# Patient Record
Sex: FEMALE | Race: WHITE | NOT HISPANIC OR LATINO | Employment: FULL TIME
[De-identification: names, ages, dates, MRNs, and addresses within clinical notes are randomized per-mention and may not be internally consistent; named-entity substitution may affect disease eponyms.]

---

## 2017-07-13 PROBLEM — I10 HTN, GOAL BELOW 130/80: Status: ACTIVE | Noted: 2017-07-13

## 2017-07-22 PROBLEM — D50.8 IRON DEFICIENCY ANEMIA SECONDARY TO INADEQUATE DIETARY IRON INTAKE: Status: ACTIVE | Noted: 2017-07-22

## 2017-09-07 ENCOUNTER — CHARTING TRANS (OUTPATIENT)
Dept: OTHER | Age: 42
End: 2017-09-07

## 2017-09-19 ENCOUNTER — HOSPITAL (OUTPATIENT)
Dept: OTHER | Age: 42
End: 2017-09-19
Attending: OBSTETRICS & GYNECOLOGY

## 2017-09-19 ENCOUNTER — IMAGING SERVICES (OUTPATIENT)
Dept: OTHER | Age: 42
End: 2017-09-19

## 2017-09-19 ENCOUNTER — CHARTING TRANS (OUTPATIENT)
Dept: OTHER | Age: 42
End: 2017-09-19

## 2018-09-27 ENCOUNTER — CHARTING TRANS (OUTPATIENT)
Dept: OTHER | Age: 43
End: 2018-09-27

## 2018-11-03 VITALS
HEIGHT: 62 IN | WEIGHT: 135 LBS | DIASTOLIC BLOOD PRESSURE: 78 MMHG | HEART RATE: 76 BPM | BODY MASS INDEX: 24.84 KG/M2 | SYSTOLIC BLOOD PRESSURE: 106 MMHG

## 2018-11-27 VITALS
WEIGHT: 145.99 LBS | DIASTOLIC BLOOD PRESSURE: 78 MMHG | HEIGHT: 62 IN | BODY MASS INDEX: 26.87 KG/M2 | SYSTOLIC BLOOD PRESSURE: 116 MMHG

## 2019-01-23 ENCOUNTER — HOSPITAL (OUTPATIENT)
Dept: OTHER | Age: 44
End: 2019-01-23
Attending: OBSTETRICS & GYNECOLOGY

## 2019-06-17 PROBLEM — G54.0 THORACIC OUTLET SYNDROME: Status: ACTIVE | Noted: 2019-06-17

## 2019-06-17 PROBLEM — G25.89 SCAPULAR DYSKINESIS: Status: ACTIVE | Noted: 2019-06-17

## 2019-06-17 PROBLEM — S46.819A TRAPEZIUS MUSCLE STRAIN: Status: ACTIVE | Noted: 2019-06-17

## 2019-06-17 PROBLEM — M75.42 IMPINGEMENT SYNDROME OF SHOULDER, LEFT: Status: ACTIVE | Noted: 2019-06-17

## 2019-07-22 PROBLEM — M54.2 CERVICAL PAIN (NECK): Status: ACTIVE | Noted: 2019-07-22

## 2019-09-05 PROBLEM — M79.18 MYOFASCIAL PAIN: Status: ACTIVE | Noted: 2019-09-05

## 2019-10-17 ENCOUNTER — OFFICE VISIT (OUTPATIENT)
Dept: OBGYN | Age: 44
End: 2019-10-17

## 2019-10-17 VITALS
DIASTOLIC BLOOD PRESSURE: 74 MMHG | BODY MASS INDEX: 25.81 KG/M2 | HEIGHT: 62 IN | WEIGHT: 140.25 LBS | SYSTOLIC BLOOD PRESSURE: 142 MMHG

## 2019-10-17 DIAGNOSIS — Z12.31 ENCOUNTER FOR SCREENING MAMMOGRAM FOR MALIGNANT NEOPLASM OF BREAST: ICD-10-CM

## 2019-10-17 DIAGNOSIS — Z12.4 PAPANICOLAOU SMEAR FOR CERVICAL CANCER SCREENING: Primary | ICD-10-CM

## 2019-10-17 DIAGNOSIS — Z11.51 SCREENING FOR HUMAN PAPILLOMAVIRUS (HPV): ICD-10-CM

## 2019-10-17 PROCEDURE — 36415 COLL VENOUS BLD VENIPUNCTURE: CPT | Performed by: OBSTETRICS & GYNECOLOGY

## 2019-10-17 PROCEDURE — 99396 PREV VISIT EST AGE 40-64: CPT | Performed by: OBSTETRICS & GYNECOLOGY

## 2019-10-17 RX ORDER — LISINOPRIL 5 MG/1
5 TABLET ORAL
COMMUNITY
Start: 2019-07-02

## 2019-10-17 ASSESSMENT — ENCOUNTER SYMPTOMS
HEADACHES: 1
UNEXPECTED WEIGHT CHANGE: 0
APPETITE CHANGE: 0
ACTIVITY CHANGE: 0
FATIGUE: 0
NERVOUS/ANXIOUS: 0

## 2019-10-28 LAB — HPV16+18+45 E6+E7MRNA CVX NAA+PROBE: NORMAL

## 2019-11-25 PROBLEM — M54.12 CERVICAL RADICULITIS: Status: ACTIVE | Noted: 2019-11-25

## 2020-02-25 ENCOUNTER — HOSPITAL (OUTPATIENT)
Dept: OTHER | Age: 45
End: 2020-02-25

## 2020-02-25 PROCEDURE — 99283 EMERGENCY DEPT VISIT LOW MDM: CPT | Performed by: EMERGENCY MEDICINE

## 2020-10-01 ENCOUNTER — EMPLOYEE HEALTH (OUTPATIENT)
Dept: OTHER | Age: 45
End: 2020-10-01

## 2020-10-01 DIAGNOSIS — Z20.822 SUSPECTED COVID-19 VIRUS INFECTION: Primary | ICD-10-CM

## 2020-10-02 ENCOUNTER — LAB SERVICES (OUTPATIENT)
Dept: LAB | Age: 45
End: 2020-10-02

## 2020-10-02 DIAGNOSIS — Z20.822 SUSPECTED COVID-19 VIRUS INFECTION: ICD-10-CM

## 2020-10-02 PROCEDURE — U0003 INFECTIOUS AGENT DETECTION BY NUCLEIC ACID (DNA OR RNA); SEVERE ACUTE RESPIRATORY SYNDROME CORONAVIRUS 2 (SARS-COV-2) (CORONAVIRUS DISEASE [COVID-19]), AMPLIFIED PROBE TECHNIQUE, MAKING USE OF HIGH THROUGHPUT TECHNOLOGIES AS DESCRIBED BY CMS-2020-01-R: HCPCS | Performed by: HOSPITALIST

## 2020-10-03 ENCOUNTER — EMPLOYEE HEALTH (OUTPATIENT)
Dept: OTHER | Age: 45
End: 2020-10-03

## 2020-10-03 LAB
SARS-COV-2 RNA RESP QL NAA+PROBE: NOT DETECTED
SERVICE CMNT-IMP: NORMAL
SPECIMEN SOURCE: NORMAL

## 2020-10-04 ENCOUNTER — EMPLOYEE HEALTH (OUTPATIENT)
Dept: OTHER | Age: 45
End: 2020-10-04

## 2020-10-06 ENCOUNTER — EMPLOYEE HEALTH (OUTPATIENT)
Dept: OTHER | Age: 45
End: 2020-10-06

## 2020-12-02 ENCOUNTER — OFFICE VISIT (OUTPATIENT)
Dept: OBGYN | Age: 45
End: 2020-12-02

## 2020-12-02 VITALS
WEIGHT: 143.6 LBS | SYSTOLIC BLOOD PRESSURE: 125 MMHG | BODY MASS INDEX: 26.43 KG/M2 | TEMPERATURE: 97.4 F | HEIGHT: 62 IN | HEART RATE: 64 BPM | DIASTOLIC BLOOD PRESSURE: 80 MMHG

## 2020-12-02 DIAGNOSIS — N63.0 BREAST LUMP: Primary | ICD-10-CM

## 2020-12-02 PROBLEM — Z78.9 RELIES ON VASECTOMY AS PRIMARY BIRTH CONTROL METHOD: Status: ACTIVE | Noted: 2020-12-02

## 2020-12-02 PROCEDURE — 99396 PREV VISIT EST AGE 40-64: CPT | Performed by: OBSTETRICS & GYNECOLOGY

## 2020-12-02 ASSESSMENT — PATIENT HEALTH QUESTIONNAIRE - PHQ9
SUM OF ALL RESPONSES TO PHQ9 QUESTIONS 1 AND 2: 0
SUM OF ALL RESPONSES TO PHQ9 QUESTIONS 1 AND 2: 0
1. LITTLE INTEREST OR PLEASURE IN DOING THINGS: NOT AT ALL
CLINICAL INTERPRETATION OF PHQ9 SCORE: NO FURTHER SCREENING NEEDED
CLINICAL INTERPRETATION OF PHQ2 SCORE: NO FURTHER SCREENING NEEDED
2. FEELING DOWN, DEPRESSED OR HOPELESS: NOT AT ALL

## 2020-12-02 ASSESSMENT — ENCOUNTER SYMPTOMS
GASTROINTESTINAL NEGATIVE: 1
NERVOUS/ANXIOUS: 0
RESPIRATORY NEGATIVE: 1
CONSTITUTIONAL NEGATIVE: 1
HEADACHES: 0

## 2020-12-09 PROBLEM — M23.91 INTERNAL DERANGEMENT OF RIGHT KNEE: Status: ACTIVE | Noted: 2020-12-09

## 2020-12-09 PROBLEM — M22.41 CHONDROMALACIA OF PATELLOFEMORAL JOINT, RIGHT: Status: ACTIVE | Noted: 2020-12-09

## 2020-12-11 ENCOUNTER — HOSPITAL ENCOUNTER (OUTPATIENT)
Dept: ULTRASOUND IMAGING | Age: 45
Discharge: HOME | End: 2020-12-11
Attending: OBSTETRICS & GYNECOLOGY

## 2020-12-11 ENCOUNTER — HOSPITAL ENCOUNTER (OUTPATIENT)
Dept: MAMMOGRAPHY | Age: 45
Discharge: HOME | End: 2020-12-11
Attending: OBSTETRICS & GYNECOLOGY

## 2020-12-11 DIAGNOSIS — N63.0 BREAST LUMP: ICD-10-CM

## 2020-12-11 PROCEDURE — 77066 DX MAMMO INCL CAD BI: CPT

## 2020-12-11 PROCEDURE — 76642 ULTRASOUND BREAST LIMITED: CPT

## 2020-12-17 ENCOUNTER — OFFICE VISIT (OUTPATIENT)
Dept: SURGERY | Age: 45
End: 2020-12-17
Attending: SURGERY

## 2020-12-17 ENCOUNTER — IMMUNIZATION (OUTPATIENT)
Dept: LAB | Age: 45
End: 2020-12-17

## 2020-12-17 VITALS
RESPIRATION RATE: 16 BRPM | SYSTOLIC BLOOD PRESSURE: 132 MMHG | WEIGHT: 140 LBS | HEIGHT: 62 IN | DIASTOLIC BLOOD PRESSURE: 85 MMHG | HEART RATE: 62 BPM | BODY MASS INDEX: 25.76 KG/M2

## 2020-12-17 DIAGNOSIS — N63.20 LEFT BREAST LUMP: Primary | ICD-10-CM

## 2020-12-17 DIAGNOSIS — Z23 NEED FOR VACCINATION: Primary | ICD-10-CM

## 2020-12-17 PROCEDURE — 3075F SYST BP GE 130 - 139MM HG: CPT | Performed by: SURGERY

## 2020-12-17 PROCEDURE — 0001A COVID 19 PFIZER-BIONTECH: CPT

## 2020-12-17 PROCEDURE — 99243 OFF/OP CNSLTJ NEW/EST LOW 30: CPT | Performed by: SURGERY

## 2020-12-17 PROCEDURE — 91300 COVID 19 PFIZER-BIONTECH: CPT

## 2020-12-17 PROCEDURE — 3079F DIAST BP 80-89 MM HG: CPT | Performed by: SURGERY

## 2020-12-17 RX ORDER — MELOXICAM 15 MG/1
TABLET ORAL
COMMUNITY
Start: 2020-12-09 | End: 2022-02-01 | Stop reason: ALTCHOICE

## 2021-01-06 ENCOUNTER — IMMUNIZATION (OUTPATIENT)
Dept: LAB | Age: 46
End: 2021-01-06

## 2021-01-06 DIAGNOSIS — Z23 NEED FOR VACCINATION: Primary | ICD-10-CM

## 2021-01-06 PROCEDURE — 0002A COVID 19 PFIZER-BIONTECH: CPT

## 2021-01-06 PROCEDURE — 91300 COVID 19 PFIZER-BIONTECH: CPT

## 2021-09-08 PROBLEM — S46.819A TRAPEZIUS MUSCLE STRAIN: Status: RESOLVED | Noted: 2019-06-17 | Resolved: 2021-09-08

## 2021-09-08 PROBLEM — M79.18 MYOFASCIAL PAIN: Status: RESOLVED | Noted: 2019-09-05 | Resolved: 2021-09-08

## 2021-09-08 PROBLEM — M75.42 IMPINGEMENT SYNDROME OF SHOULDER, LEFT: Status: RESOLVED | Noted: 2019-06-17 | Resolved: 2021-09-08

## 2021-09-08 PROBLEM — M54.2 CERVICAL PAIN (NECK): Status: RESOLVED | Noted: 2019-07-22 | Resolved: 2021-09-08

## 2021-09-08 PROBLEM — M23.91 INTERNAL DERANGEMENT OF RIGHT KNEE: Status: RESOLVED | Noted: 2020-12-09 | Resolved: 2021-09-08

## 2021-09-08 PROBLEM — G25.89 SCAPULAR DYSKINESIS: Status: RESOLVED | Noted: 2019-06-17 | Resolved: 2021-09-08

## 2021-10-08 ENCOUNTER — TELEPHONE (OUTPATIENT)
Dept: SCHEDULING | Age: 46
End: 2021-10-08

## 2021-10-20 ENCOUNTER — IMMUNIZATION (OUTPATIENT)
Dept: LAB | Age: 46
End: 2021-10-20

## 2021-10-20 DIAGNOSIS — Z23 NEED FOR VACCINATION: Primary | ICD-10-CM

## 2021-10-20 PROCEDURE — 91300 COVID 19 PFIZER-BIONTECH: CPT

## 2021-10-20 PROCEDURE — 0003A COVID 19 PFIZER-BIONTECH: CPT

## 2022-01-03 ENCOUNTER — EMPLOYEE HEALTH (OUTPATIENT)
Dept: OTHER | Age: 47
End: 2022-01-03

## 2022-01-05 ENCOUNTER — EMPLOYEE HEALTH (OUTPATIENT)
Dept: OTHER | Age: 47
End: 2022-01-05

## 2022-02-01 ENCOUNTER — OFFICE VISIT (OUTPATIENT)
Dept: OBGYN | Age: 47
End: 2022-02-01

## 2022-02-01 VITALS
HEART RATE: 62 BPM | DIASTOLIC BLOOD PRESSURE: 79 MMHG | WEIGHT: 145 LBS | SYSTOLIC BLOOD PRESSURE: 126 MMHG | BODY MASS INDEX: 26.52 KG/M2

## 2022-02-01 DIAGNOSIS — Z12.31 ENCOUNTER FOR SCREENING MAMMOGRAM FOR MALIGNANT NEOPLASM OF BREAST: Primary | ICD-10-CM

## 2022-02-01 PROCEDURE — 99396 PREV VISIT EST AGE 40-64: CPT | Performed by: OBSTETRICS & GYNECOLOGY

## 2022-02-01 PROCEDURE — 3074F SYST BP LT 130 MM HG: CPT | Performed by: OBSTETRICS & GYNECOLOGY

## 2022-02-01 PROCEDURE — 3078F DIAST BP <80 MM HG: CPT | Performed by: OBSTETRICS & GYNECOLOGY

## 2022-02-01 ASSESSMENT — ENCOUNTER SYMPTOMS
GASTROINTESTINAL NEGATIVE: 1
RESPIRATORY NEGATIVE: 1
HEADACHES: 0
CONSTITUTIONAL NEGATIVE: 1
NERVOUS/ANXIOUS: 0

## 2022-02-16 ENCOUNTER — HOSPITAL ENCOUNTER (OUTPATIENT)
Dept: MAMMOGRAPHY | Age: 47
Discharge: HOME | End: 2022-02-16
Attending: OBSTETRICS & GYNECOLOGY

## 2022-02-16 DIAGNOSIS — Z12.31 ENCOUNTER FOR SCREENING MAMMOGRAM FOR MALIGNANT NEOPLASM OF BREAST: ICD-10-CM

## 2022-02-16 PROCEDURE — 77067 SCR MAMMO BI INCL CAD: CPT

## 2022-09-28 ENCOUNTER — IMMUNIZATION (OUTPATIENT)
Dept: INTERNAL MEDICINE | Age: 47
End: 2022-09-28

## 2022-09-28 DIAGNOSIS — Z23 NEED FOR INFLUENZA VACCINATION: Primary | ICD-10-CM

## 2022-09-28 PROCEDURE — 90686 IIV4 VACC NO PRSV 0.5 ML IM: CPT | Performed by: INTERNAL MEDICINE

## 2022-09-28 PROCEDURE — 90471 IMMUNIZATION ADMIN: CPT | Performed by: INTERNAL MEDICINE

## 2022-11-03 ENCOUNTER — IMAGING SERVICES (OUTPATIENT)
Dept: OTHER | Age: 47
End: 2022-11-03
Attending: OBSTETRICS & GYNECOLOGY

## 2023-03-30 ENCOUNTER — APPOINTMENT (OUTPATIENT)
Dept: OBGYN | Age: 48
End: 2023-03-30

## 2023-04-06 ENCOUNTER — OFFICE VISIT (OUTPATIENT)
Dept: OBGYN | Age: 48
End: 2023-04-06

## 2023-04-06 VITALS
RESPIRATION RATE: 17 BRPM | WEIGHT: 151.01 LBS | BODY MASS INDEX: 27.62 KG/M2 | SYSTOLIC BLOOD PRESSURE: 122 MMHG | HEART RATE: 63 BPM | OXYGEN SATURATION: 99 % | DIASTOLIC BLOOD PRESSURE: 85 MMHG

## 2023-04-06 DIAGNOSIS — N92.0 MENORRHAGIA WITH REGULAR CYCLE: Primary | ICD-10-CM

## 2023-04-06 DIAGNOSIS — Z12.4 SCREENING FOR CERVICAL CANCER: ICD-10-CM

## 2023-04-06 PROCEDURE — 88175 CYTOPATH C/V AUTO FLUID REDO: CPT | Performed by: INTERNAL MEDICINE

## 2023-04-06 PROCEDURE — 87624 HPV HI-RISK TYP POOLED RSLT: CPT | Performed by: INTERNAL MEDICINE

## 2023-04-06 PROCEDURE — 36415 COLL VENOUS BLD VENIPUNCTURE: CPT | Performed by: OBSTETRICS & GYNECOLOGY

## 2023-04-06 PROCEDURE — 3074F SYST BP LT 130 MM HG: CPT | Performed by: OBSTETRICS & GYNECOLOGY

## 2023-04-06 PROCEDURE — 3079F DIAST BP 80-89 MM HG: CPT | Performed by: OBSTETRICS & GYNECOLOGY

## 2023-04-06 PROCEDURE — 85027 COMPLETE CBC AUTOMATED: CPT | Performed by: INTERNAL MEDICINE

## 2023-04-06 PROCEDURE — 99396 PREV VISIT EST AGE 40-64: CPT | Performed by: OBSTETRICS & GYNECOLOGY

## 2023-04-06 RX ORDER — POLYETHYLENE GLYCOL 3350, SODIUM CHLORIDE, SODIUM BICARBONATE, POTASSIUM CHLORIDE 420; 11.2; 5.72; 1.48 G/4L; G/4L; G/4L; G/4L
POWDER, FOR SOLUTION ORAL
COMMUNITY
Start: 2023-03-22

## 2023-04-06 RX ORDER — LISINOPRIL 5 MG/1
5 TABLET ORAL
COMMUNITY
Start: 2023-01-31 | End: 2023-04-06 | Stop reason: SDUPTHER

## 2023-04-06 ASSESSMENT — ENCOUNTER SYMPTOMS
RESPIRATORY NEGATIVE: 1
PSYCHIATRIC NEGATIVE: 1
GASTROINTESTINAL NEGATIVE: 1
CONSTITUTIONAL NEGATIVE: 1

## 2023-04-06 ASSESSMENT — PAIN SCALES - GENERAL: PAINLEVEL: 0

## 2023-04-07 LAB
DEPRECATED RDW RBC: 44.8 FL (ref 39–50)
ERYTHROCYTE [DISTWIDTH] IN BLOOD: 13.5 % (ref 11–15)
HCT VFR BLD CALC: 35.4 % (ref 36–46.5)
HGB BLD-MCNC: 11.1 G/DL (ref 12–15.5)
MCH RBC QN AUTO: 28.2 PG (ref 26–34)
MCHC RBC AUTO-ENTMCNC: 31.4 G/DL (ref 32–36.5)
MCV RBC AUTO: 90.1 FL (ref 78–100)
NRBC BLD MANUAL-RTO: 0 /100 WBC
PLATELET # BLD AUTO: 232 K/MCL (ref 140–450)
RBC # BLD: 3.93 MIL/MCL (ref 4–5.2)
WBC # BLD: 5.5 K/MCL (ref 4.2–11)

## 2023-04-11 LAB
CASE RPRT: NORMAL
CLINICAL INFO: NORMAL
CYTOLOGY CVX/VAG STUDY: NORMAL
HPV16+18+45 E6+E7MRNA CVX NAA+PROBE: NEGATIVE
Lab: NORMAL
PAP EDUCATIONAL NOTE: NORMAL
SPECIMEN ADEQUACY: NORMAL

## 2023-05-04 LAB — COLONOSCOPY STUDY: ABNORMAL

## 2023-12-07 ENCOUNTER — TELEPHONE (OUTPATIENT)
Dept: ORTHOPEDICS CLINIC | Facility: CLINIC | Age: 48
End: 2023-12-07

## 2023-12-07 DIAGNOSIS — M79.632 LEFT FOREARM PAIN: Primary | ICD-10-CM

## 2023-12-07 DIAGNOSIS — M25.512 LEFT SHOULDER PAIN, UNSPECIFIED CHRONICITY: ICD-10-CM

## 2023-12-07 NOTE — TELEPHONE ENCOUNTER
Patient called for left forearm pain.  Please advise for imaging   Future Appointments   Date Time Provider Lalit Bonds   12/8/2023  9:40 AM MARLENY Nails [id number]

## 2023-12-08 ENCOUNTER — HOSPITAL ENCOUNTER (OUTPATIENT)
Dept: GENERAL RADIOLOGY | Age: 48
Discharge: HOME | End: 2023-12-08
Attending: PHYSICIAN ASSISTANT
Payer: COMMERCIAL

## 2023-12-08 ENCOUNTER — OFFICE VISIT (OUTPATIENT)
Dept: ORTHOPEDICS CLINIC | Facility: CLINIC | Age: 48
End: 2023-12-08
Payer: COMMERCIAL

## 2023-12-08 VITALS — WEIGHT: 140 LBS | HEIGHT: 62 IN | BODY MASS INDEX: 25.76 KG/M2

## 2023-12-08 DIAGNOSIS — M79.632 LEFT FOREARM PAIN: ICD-10-CM

## 2023-12-08 DIAGNOSIS — M77.12 LATERAL EPICONDYLITIS OF LEFT ELBOW: Primary | ICD-10-CM

## 2023-12-08 PROCEDURE — 99203 OFFICE O/P NEW LOW 30 MIN: CPT | Performed by: PHYSICIAN ASSISTANT

## 2023-12-08 PROCEDURE — 73090 X-RAY EXAM OF FOREARM: CPT | Performed by: PHYSICIAN ASSISTANT

## 2023-12-08 PROCEDURE — 3008F BODY MASS INDEX DOCD: CPT | Performed by: PHYSICIAN ASSISTANT

## 2023-12-08 RX ORDER — MELOXICAM 15 MG/1
15 TABLET ORAL DAILY
Qty: 14 TABLET | Refills: 1 | Status: SHIPPED | OUTPATIENT
Start: 2023-12-08

## 2024-01-01 ENCOUNTER — MED REC SCAN ONLY (OUTPATIENT)
Dept: ORTHOPEDICS CLINIC | Facility: CLINIC | Age: 49
End: 2024-01-01

## 2024-01-26 ENCOUNTER — OFFICE VISIT (OUTPATIENT)
Dept: ORTHOPEDICS CLINIC | Facility: CLINIC | Age: 49
End: 2024-01-26
Payer: COMMERCIAL

## 2024-01-26 DIAGNOSIS — M77.12 LATERAL EPICONDYLITIS OF LEFT ELBOW: Primary | ICD-10-CM

## 2024-01-26 RX ORDER — TRIAMCINOLONE ACETONIDE 40 MG/ML
40 INJECTION, SUSPENSION INTRA-ARTICULAR; INTRAMUSCULAR ONCE
Status: COMPLETED | OUTPATIENT
Start: 2024-01-26 | End: 2024-01-26

## 2024-01-26 RX ADMIN — TRIAMCINOLONE ACETONIDE 40 MG: 40 INJECTION, SUSPENSION INTRA-ARTICULAR; INTRAMUSCULAR at 09:28:00

## 2024-01-26 NOTE — PROCEDURES
Left Elbow Joint Injection    Name: Jane De La Rosa   MRN: [id number]  Date: 1/26/2024     Clinical Indications:   Lateral Epicondylitis    After informed consent, the injection site was marked, sterilized with topical chlorhexidine antiseptic, and locally anesthetized with skin refrigerant.    The patient was seated upright and the shoulder was exposed. Using sterile technique: 1 mL of 0.5% Bupivicaine, 1 mL of 1% Lidocaine, and 1 mL of 40mg/mL Triamcinolone was injected with direct approach utilizing a 25 gauge needle.  A band-aid was applied.  The patient tolerated the procedure well.    Disposition:   Continue with physical/occupational therapy and follow up in clinic if symptoms do not resolve in 8 weeks.           Yosi Reilly, [facility], PA-C Orthopedic Surgery / Sports Medicine Specialist  [facility]  [address] [place] [address].org  [email or web address]  t: [phone]  o: [phone]  f: [phone]          This note was dictated using Dragon software.  While it was briefly proofread prior to completion, some grammatical, spelling, and word choice errors due to dictation may still occur.

## 2024-01-26 NOTE — PROGRESS NOTES
[facility] - ORTHOPEDICS  [address] [place] [address]  [phone]       Name: Jane De La Rosa   MRN: [id number]  Date: 1/26/2024     REASON FOR VISIT: Follow up for left lateral epicondylitis.      INTERVAL HISTORY:  Jane De La Rosa is a 48 year old female who returns for evaluation of left  lateral epicondylitis.      To summarize, LEFT elbow pain that occurred one month ago, without a clear injury or mechanism of trauma. Her pain has worsened, and she complains of at the lateral aspect of her elbow and throughout the forearm. No tingling or numbness. She works as a respiratory therapist. Pain is a 4-5/10. At her last visit we recommended conservative treatment, and she began PT with some improvement but complains of persistent tenderness.     ROS: ROS    PE:   There were no vitals filed for this visit.  Estimated body mass index is 25.61 kg/m² as calculated from the following:    Height as of 12/8/23: 5' 2\" (1.575 m).    Weight as of 12/8/23: 140 lb (63.5 kg).    Physical Exam  Constitutional:       Appearance: Normal appearance.   HENT:      Head: Normocephalic and atraumatic.   Eyes:      Extraocular Movements: Extraocular movements intact.   Neck:      Musculoskeletal: Normal range of motion and neck supple.   Cardiovascular:      Pulses: Normal pulses.   Pulmonary:      Effort: Pulmonary effort is normal. No respiratory distress.   Abdominal:      General: There is no distension.   Skin:     General: Skin is warm.      Capillary Refill: Capillary refill takes less than 2 seconds.      Findings: No bruising.   Neurological:      General: No focal deficit present.      Mental Status: She is alert.   Psychiatric:         Mood and Affect: Mood normal.     Examination of the left elbow demonstrates:     Skin is intact, warm and dry.     Inspection:   Atrophy: none    Effusion: none    Edema: mild    Erythema: none    Hematoma: none    Nail changes:  none    Deformities: none       Palpation:   Radius: none    Ulna: none    Scaphoid: none    Lunate:  none    Triquetrum: none    Pisiform: none    Trapezium: none    Trapezoid: none    Capitate: none  Hamate: none   DRUJ: none   Dorsal Wrist: none   Galvan Wrist: none   Metacarpals none   DIP: none   PIP: none   +lateral epicondyle     ROM:   Wrist: Full and symmetric   Fingers: Full and symmetric     Strength: mild weakness     Special Tests:   Median Nerve: Negative  Ulnar Nerve: Negative   Radial Nerve:  Negative     No obvious peripheral edema noted.   Distal neurovascular exam demonstrates normal perfusion, intact sensation to light touch and full strength.     Examination of the contralateral hand/wrist demonstrates:  No significant atrophy, swelling or effusion. Full range of motion. Neurovascularly intact distally.        IMPRESSION: Jane De La Rosa is a 48 year old female who presented for follow up of left elbow lateral epicondylitis.     PLAN:   We had a detailed discussion outlining the etiology, anatomy, pathophysiology, and natural history of the patient's findings.    We reviewed the treatment of this disease condition.  After a discussion of a variety of conservative treatment options we elected to proceed with the injection procedure at today's visit. We discussed the risk and benefits of the procedure, including, but not limited to: infection, injury to blood vessels, nerve injury, prolonged pain, swelling, site soreness, failure to progress, and need for advanced treatments.  The patient voiced understanding and agreed to proceed with the treatment plan.      We recommended physical therapy to aid in strengthening, range of motion, functional improvement, and return to baseline activity.  The patient had opportunity to ask questions and all questions were answered appropriately.    FOLLOW-UP:  Return to clinic on an as needed basis.             CAROLYN Zapata, PA-C Orthopedic Surgery / Sports Medicine  Specialist  [facility]  [address] [place] [id number]   [email or web address]  [email or web address]  t: [phone]  o: [phone]  f: [phone]    This note was dictated using Dragon software.  While it was briefly proofread prior to completion, some grammatical, spelling, and word choice errors due to dictation may still occur.

## 2024-02-21 ENCOUNTER — MED REC SCAN ONLY (OUTPATIENT)
Dept: ORTHOPEDICS CLINIC | Facility: CLINIC | Age: 49
End: 2024-02-21

## 2024-04-01 ENCOUNTER — E-ADVICE (OUTPATIENT)
Dept: OTHER | Age: 49
End: 2024-04-01

## 2024-04-11 ENCOUNTER — OFFICE VISIT (OUTPATIENT)
Dept: OBGYN | Age: 49
End: 2024-04-11

## 2024-04-11 VITALS
WEIGHT: 134.48 LBS | BODY MASS INDEX: 24.6 KG/M2 | SYSTOLIC BLOOD PRESSURE: 138 MMHG | HEART RATE: 89 BPM | DIASTOLIC BLOOD PRESSURE: 84 MMHG | RESPIRATION RATE: 16 BRPM | OXYGEN SATURATION: 100 %

## 2024-04-11 DIAGNOSIS — Z12.31 ENCOUNTER FOR SCREENING MAMMOGRAM FOR MALIGNANT NEOPLASM OF BREAST: ICD-10-CM

## 2024-04-11 DIAGNOSIS — Z12.4 CERVICAL CANCER SCREENING: Primary | ICD-10-CM

## 2024-04-11 DIAGNOSIS — N39.3 SUI (STRESS URINARY INCONTINENCE, FEMALE): ICD-10-CM

## 2024-04-11 ASSESSMENT — ENCOUNTER SYMPTOMS
CONSTITUTIONAL NEGATIVE: 1
NERVOUS/ANXIOUS: 0
HEADACHES: 0
RESPIRATORY NEGATIVE: 1
GASTROINTESTINAL NEGATIVE: 1

## 2024-04-11 ASSESSMENT — PATIENT HEALTH QUESTIONNAIRE - PHQ9
1. LITTLE INTEREST OR PLEASURE IN DOING THINGS: NOT AT ALL
2. FEELING DOWN, DEPRESSED OR HOPELESS: NOT AT ALL
SUM OF ALL RESPONSES TO PHQ9 QUESTIONS 1 AND 2: 0
SUM OF ALL RESPONSES TO PHQ9 QUESTIONS 1 AND 2: 0
CLINICAL INTERPRETATION OF PHQ2 SCORE: NO FURTHER SCREENING NEEDED

## 2024-04-11 ASSESSMENT — PAIN SCALES - GENERAL: PAINLEVEL: 0

## 2024-04-16 ENCOUNTER — HOSPITAL ENCOUNTER (OUTPATIENT)
Dept: PHYSICAL MEDICINE AND REHAB | Age: 49
Discharge: STILL ADMITTED OR ADMINISTRATIVE | End: 2024-04-16
Attending: OBSTETRICS & GYNECOLOGY

## 2024-04-16 PROCEDURE — 97110 THERAPEUTIC EXERCISES: CPT | Performed by: PHYSICAL THERAPIST

## 2024-04-16 PROCEDURE — 97161 PT EVAL LOW COMPLEX 20 MIN: CPT | Performed by: PHYSICAL THERAPIST

## 2024-04-22 ENCOUNTER — APPOINTMENT (OUTPATIENT)
Dept: PHYSICAL MEDICINE AND REHAB | Age: 49
End: 2024-04-22
Attending: OBSTETRICS & GYNECOLOGY

## 2024-04-22 LAB
CASE RPRT: ABNORMAL
CYTOLOGY CVX/VAG STUDY: ABNORMAL
CYTOLOGY CVX/VAG STUDY: ABNORMAL
GEN CATEG CVX/VAG CYTO-IMP: ABNORMAL
HPV16+18+45 E6+E7MRNA CVX NAA+PROBE: NEGATIVE
Lab: NORMAL
PAP EDUCATIONAL NOTE: ABNORMAL
SPECIMEN ADEQUACY: ABNORMAL

## 2024-04-22 PROCEDURE — 97110 THERAPEUTIC EXERCISES: CPT | Performed by: PHYSICAL THERAPIST

## 2024-04-29 ENCOUNTER — APPOINTMENT (OUTPATIENT)
Dept: PHYSICAL MEDICINE AND REHAB | Age: 49
End: 2024-04-29

## 2024-04-29 ENCOUNTER — HOSPITAL ENCOUNTER (OUTPATIENT)
Dept: CT IMAGING | Age: 49
Discharge: HOME | End: 2024-04-29
Attending: OBSTETRICS & GYNECOLOGY

## 2024-04-29 DIAGNOSIS — Z12.31 ENCOUNTER FOR SCREENING MAMMOGRAM FOR MALIGNANT NEOPLASM OF BREAST: ICD-10-CM

## 2024-04-29 PROCEDURE — 77063 BREAST TOMOSYNTHESIS BI: CPT

## 2024-04-29 PROCEDURE — 77067 SCR MAMMO BI INCL CAD: CPT

## 2024-04-29 PROCEDURE — 97110 THERAPEUTIC EXERCISES: CPT | Performed by: PHYSICAL THERAPIST

## 2024-05-07 ENCOUNTER — HOSPITAL ENCOUNTER (OUTPATIENT)
Dept: PHYSICAL MEDICINE AND REHAB | Age: 49
Discharge: STILL ADMITTED OR ADMINISTRATIVE | End: 2024-05-07

## 2024-05-07 PROCEDURE — 97112 NEUROMUSCULAR REEDUCATION: CPT | Performed by: PHYSICAL THERAPIST

## 2024-05-07 PROCEDURE — 97535 SELF CARE MNGMENT TRAINING: CPT | Performed by: PHYSICAL THERAPIST

## 2024-05-13 ENCOUNTER — TELEPHONE (OUTPATIENT)
Dept: OBGYN | Age: 49
End: 2024-05-13

## 2024-05-13 ENCOUNTER — E-ADVICE (OUTPATIENT)
Dept: OBGYN | Age: 49
End: 2024-05-13

## 2024-05-13 ENCOUNTER — CLINICAL DOCUMENTATION (OUTPATIENT)
Dept: CT IMAGING | Age: 49
End: 2024-05-13

## 2024-06-06 ENCOUNTER — APPOINTMENT (OUTPATIENT)
Dept: PHYSICAL MEDICINE AND REHAB | Age: 49
End: 2024-06-06

## 2024-06-20 ENCOUNTER — TELEPHONE (OUTPATIENT)
Dept: OBGYN | Age: 49
End: 2024-06-20

## 2024-07-23 ENCOUNTER — OFFICE VISIT (OUTPATIENT)
Dept: OBGYN | Age: 49
End: 2024-07-23

## 2024-07-23 ENCOUNTER — LAB SERVICES (OUTPATIENT)
Dept: LAB | Age: 49
End: 2024-07-23

## 2024-07-23 VITALS
BODY MASS INDEX: 25.42 KG/M2 | WEIGHT: 139 LBS | DIASTOLIC BLOOD PRESSURE: 90 MMHG | SYSTOLIC BLOOD PRESSURE: 138 MMHG | HEART RATE: 67 BPM

## 2024-07-23 DIAGNOSIS — N92.6 IRREGULAR BLEEDING: ICD-10-CM

## 2024-07-23 DIAGNOSIS — N92.6 IRREGULAR BLEEDING: Primary | ICD-10-CM

## 2024-07-23 LAB
DEPRECATED RDW RBC: 48 FL (ref 39–50)
ERYTHROCYTE [DISTWIDTH] IN BLOOD: 14.4 % (ref 11–15)
ESTRADIOL SERPL-MCNC: 102 PG/ML
FSH SERPL-ACNC: 7.3 MUNITS/ML
HCT VFR BLD CALC: 36.2 % (ref 36–46.5)
HGB BLD-MCNC: 11.8 G/DL (ref 12–15.5)
LH SERPL-ACNC: 6.9 MUNITS/ML
MCH RBC QN AUTO: 29.3 PG (ref 26–34)
MCHC RBC AUTO-ENTMCNC: 32.6 G/DL (ref 32–36.5)
MCV RBC AUTO: 89.8 FL (ref 78–100)
NRBC BLD MANUAL-RTO: 0 /100 WBC
PLATELET # BLD AUTO: 217 K/MCL (ref 140–450)
PROLACTIN SERPL-MCNC: 8.7 NG/ML (ref 2.8–29.2)
RBC # BLD: 4.03 MIL/MCL (ref 4–5.2)
WBC # BLD: 4.5 K/MCL (ref 4.2–11)

## 2024-07-23 PROCEDURE — 36415 COLL VENOUS BLD VENIPUNCTURE: CPT | Performed by: OBSTETRICS & GYNECOLOGY

## 2024-07-23 PROCEDURE — 83001 ASSAY OF GONADOTROPIN (FSH): CPT | Performed by: CLINICAL MEDICAL LABORATORY

## 2024-07-23 PROCEDURE — 82670 ASSAY OF TOTAL ESTRADIOL: CPT | Performed by: CLINICAL MEDICAL LABORATORY

## 2024-07-23 PROCEDURE — 84146 ASSAY OF PROLACTIN: CPT | Performed by: CLINICAL MEDICAL LABORATORY

## 2024-07-23 PROCEDURE — 85027 COMPLETE CBC AUTOMATED: CPT | Performed by: CLINICAL MEDICAL LABORATORY

## 2024-07-23 PROCEDURE — 99213 OFFICE O/P EST LOW 20 MIN: CPT | Performed by: OBSTETRICS & GYNECOLOGY

## 2024-07-23 PROCEDURE — 83002 ASSAY OF GONADOTROPIN (LH): CPT | Performed by: CLINICAL MEDICAL LABORATORY

## 2024-07-23 PROCEDURE — 3075F SYST BP GE 130 - 139MM HG: CPT | Performed by: OBSTETRICS & GYNECOLOGY

## 2024-08-28 ENCOUNTER — APPOINTMENT (OUTPATIENT)
Dept: OBGYN | Age: 49
End: 2024-08-28

## 2024-08-28 VITALS
OXYGEN SATURATION: 98 % | BODY MASS INDEX: 26.03 KG/M2 | SYSTOLIC BLOOD PRESSURE: 124 MMHG | WEIGHT: 142.3 LBS | DIASTOLIC BLOOD PRESSURE: 77 MMHG | HEART RATE: 74 BPM | RESPIRATION RATE: 16 BRPM

## 2024-08-28 DIAGNOSIS — N93.9 ABNORMAL VAGINAL BLEEDING: Primary | ICD-10-CM

## 2024-08-28 ASSESSMENT — PAIN SCALES - GENERAL: PAINLEVEL: 0

## 2024-09-23 ENCOUNTER — CLINICAL ABSTRACT (OUTPATIENT)
Dept: FAMILY MEDICINE | Age: 49
End: 2024-09-23

## 2024-11-06 ENCOUNTER — OFFICE VISIT (OUTPATIENT)
Dept: ORTHOPEDICS CLINIC | Facility: CLINIC | Age: 49
End: 2024-11-06
Payer: COMMERCIAL

## 2024-11-06 DIAGNOSIS — M77.12 LATERAL EPICONDYLITIS OF LEFT ELBOW: Primary | ICD-10-CM

## 2024-11-06 DIAGNOSIS — M25.322 INSTABILITY OF LEFT ELBOW JOINT: ICD-10-CM

## 2024-11-06 PROCEDURE — 99214 OFFICE O/P EST MOD 30 MIN: CPT | Performed by: PHYSICIAN ASSISTANT

## 2024-11-06 NOTE — PROGRESS NOTES
[facility] - ORTHOPEDICS  [address] [place] [address]  [phone]       Name: Jane De La Rosa   MRN: [id number]  Date: 11/6/2024     REASON FOR VISIT: Follow up for left lateral epicondylitis.    INTERVAL HISTORY:  Jane De La Rosa is a 49 year old female who returns for evaluation of left lateral epicondylitis.  At the patient's last visit she was evaluated in January 2024 and underwent a steroid injection.  She had relief but notes some persistent pain discomfort in September. She has attempted physical therapy- no improvement. She complain of instability, and notices a prominence laterally at the ECRB. Difficulty with ROM. Pain is a 4-5/10.     ROS: ROS    PE:   There were no vitals filed for this visit.  Estimated body mass index is 25.61 kg/m² as calculated from the following:    Height as of 12/8/23: 5' 2\" (1.575 m).    Weight as of 12/8/23: 140 lb (63.5 kg).    Physical Exam  Constitutional:       Appearance: Normal appearance.   HENT:      Head: Normocephalic and atraumatic.   Eyes:      Extraocular Movements: Extraocular movements intact.   Neck:      Musculoskeletal: Normal range of motion and neck supple.   Cardiovascular:      Pulses: Normal pulses.   Pulmonary:      Effort: Pulmonary effort is normal. No respiratory distress.   Abdominal:      General: There is no distension.   Skin:     General: Skin is warm.      Capillary Refill: Capillary refill takes less than 2 seconds.      Findings: No bruising.   Neurological:      General: No focal deficit present.      Mental Status: She is alert.   Psychiatric:         Mood and Affect: Mood normal.       Examination of the left ELBOW demonstrates:     Skin is intact, warm and dry.   Cervical:  Full ROM  Spurling's  Negative    Deformity:   none  Atrophy:   none    Scapular winging: Negative    Palpation:     +lateral epicondyle/ECRB     ROM:   Forward Flexion:  full and symmetric  Abduction:   full and  symmetric  External Rotation:  full and symmetric  Internal Rotation:  full and symmetric    Rotator Cuff Strength:   Supraspinatus:   5/5  Subscapularis:   5/5  Infraspinatus/Teres: 5/5    Provocative Tests:   Pain with resisted wrist extension     Neurovascular Upper Extremity (Bilateral)  Motor:    5/5 EPL, Finger Abduction, , Pinch, Deltoid  Sensation:   intact to light touch median, ulnar, radial and axillary nerve  Circulation:   Normal, 2+ radial pulse    The contralateral upper extremity is without limitation in range of motion or strength, no positive provocative maneuvers.       Radiographic Examination/Diagnostics:    I personally viewed, independently interpreted and radiology report was reviewed.    12/08/2024- Left Forearm:     Impression   CONCLUSION:  Unremarkable left forearm radiographs.       IMPRESSION: Jane De La Rosa is a 49 year old female who presented for follow up of chronic left elbow pain concerning for loose body, and lateral epciondylitis.     PLAN:   We had a detailed discussion outlining the etiology, anatomy, pathophysiology, and natural history of the patient's findings.    We reviewed the treatment of this disease condition.  In light of the chronicity of symptoms, loss of normal function, and  failure to progress conservatively we recommend an MRI to evaluate the integrity of the patient's ECRB and rule out loose body. The patient will follow up after imaging.     External records were also reviewed for pertinent historical findings contributing to the patients undiagnosed new problem with uncertain prognosis.     The patient had the opportunity to ask questions, and all questions were answered appropriately.       FOLLOW-UP:  Return to clinic following completion of MRI to review scan and findings.             CAROLYN Zapata, PA-C Orthopedic Surgery / Sports Medicine Specialist  [facility]  [address] [place] [address]   [email or web address]   [email or web address]  t: [phone]  o: [phone]  f: [phone]    This note was dictated using Dragon software.  While it was briefly proofread prior to completion, some grammatical, spelling, and word choice errors due to dictation may still occur.

## 2024-12-10 ENCOUNTER — HOSPITAL ENCOUNTER (OUTPATIENT)
Dept: MRI IMAGING | Facility: HOSPITAL | Age: 49
Discharge: HOME | End: 2024-12-10
Attending: PHYSICIAN ASSISTANT
Payer: COMMERCIAL

## 2024-12-10 DIAGNOSIS — M77.12 LATERAL EPICONDYLITIS OF LEFT ELBOW: ICD-10-CM

## 2024-12-10 DIAGNOSIS — M25.322 INSTABILITY OF LEFT ELBOW JOINT: ICD-10-CM

## 2024-12-10 PROCEDURE — 73221 MRI JOINT UPR EXTREM W/O DYE: CPT | Performed by: PHYSICIAN ASSISTANT

## 2024-12-27 ENCOUNTER — OFFICE VISIT (OUTPATIENT)
Dept: ORTHOPEDICS CLINIC | Facility: CLINIC | Age: 49
End: 2024-12-27
Payer: COMMERCIAL

## 2024-12-27 DIAGNOSIS — M25.422 ELBOW JOINT EFFUSION, LEFT: ICD-10-CM

## 2024-12-27 DIAGNOSIS — M77.12 LATERAL EPICONDYLITIS OF LEFT ELBOW: Primary | ICD-10-CM

## 2024-12-27 PROCEDURE — 99213 OFFICE O/P EST LOW 20 MIN: CPT | Performed by: PHYSICIAN ASSISTANT

## 2025-01-14 ENCOUNTER — OFFICE VISIT (OUTPATIENT)
Dept: ORTHOPEDICS CLINIC | Facility: CLINIC | Age: 50
End: 2025-01-14
Payer: COMMERCIAL

## 2025-01-14 DIAGNOSIS — M77.12 LATERAL EPICONDYLITIS OF LEFT ELBOW: Primary | ICD-10-CM

## 2025-01-14 PROCEDURE — 0232T NJX PLATELET PLASMA: CPT | Performed by: PHYSICIAN ASSISTANT

## 2025-01-14 NOTE — PROCEDURES
Left Elbow Joint Injection    Name: Jane De La Rosa   MRN: [id number]  Date: 1/14/2025     Clinical Indications:   Lateral Epicondylitis    After informed consent, the injection site was marked, sterilized with topical chlorhexidine antiseptic, and locally anesthetized with skin refrigerant.    The patient was seated upright and the shoulder was exposed. Using sterile technique: a prepared PRP syringe was injected with direct approach utilizing a 23 gauge needle.  A band-aid was applied.  The patient tolerated the procedure well.    Disposition:   Continue with physical/occupational therapy and follow up in clinic if symptoms do not resolve in 8 weeks.           Yosi Reilly [facility], PA-C Orthopedic Surgery / Sports Medicine Specialist  [facility] Orthopa[facility] Surgery  [address] [place] [address].[email or web address]  [email or web address]  t: [phone]  o: [phone]  f: [phone]    This note was dictated using Dragon software.  While it was briefly proofread prior to completion, some grammatical, spelling, and word choice errors due to dictation may still occur.

## 2025-03-01 ENCOUNTER — MED REC SCAN ONLY (OUTPATIENT)
Facility: CLINIC | Age: 50
End: 2025-03-01

## 2025-04-15 ENCOUNTER — APPOINTMENT (OUTPATIENT)
Dept: OBGYN | Age: 50
End: 2025-04-15

## 2025-08-28 ENCOUNTER — HOSPITAL ENCOUNTER (OUTPATIENT)
Facility: HOSPITAL | Age: 50
Setting detail: OBSERVATION
Discharge: HOME | End: 2025-08-29
Attending: STUDENT IN AN ORGANIZED HEALTH CARE EDUCATION/TRAINING PROGRAM | Admitting: INTERNAL MEDICINE

## 2025-08-28 DIAGNOSIS — E87.6 HYPOKALEMIA: ICD-10-CM

## 2025-08-28 DIAGNOSIS — R55 SYNCOPE AND COLLAPSE: Primary | ICD-10-CM

## 2025-08-28 DIAGNOSIS — R94.31 PROLONGED QT INTERVAL: ICD-10-CM

## 2025-08-28 LAB
ANION GAP SERPL CALC-SCNC: 14 MMOL/L (ref 0–18)
BASOPHILS # BLD AUTO: 0.03 X10(3) UL (ref 0–0.2)
BASOPHILS NFR BLD AUTO: 0.5 %
BUN BLD-MCNC: 7 MG/DL (ref 9–23)
CALCIUM BLD-MCNC: 9.3 MG/DL (ref 8.7–10.6)
CHLORIDE SERPL-SCNC: 104 MMOL/L (ref 98–112)
CO2 SERPL-SCNC: 20 MMOL/L (ref 21–32)
CREAT BLD-MCNC: 0.98 MG/DL (ref 0.55–1.02)
EGFRCR SERPLBLD CKD-EPI 2021: 71 ML/MIN/1.73M2 (ref 60–?)
EOSINOPHIL # BLD AUTO: 0.08 X10(3) UL (ref 0–0.7)
EOSINOPHIL NFR BLD AUTO: 1.4 %
ERYTHROCYTE [DISTWIDTH] IN BLOOD BY AUTOMATED COUNT: 13.1 %
GLUCOSE BLD-MCNC: 99 MG/DL (ref 70–99)
GLUCOSE BLD-MCNC: 99 MG/DL (ref 70–99)
HCG SERPL QL: NEGATIVE
HCT VFR BLD AUTO: 33.7 % (ref 35–48)
HGB BLD-MCNC: 11.3 G/DL (ref 12–16)
IMM GRANULOCYTES # BLD AUTO: 0.02 X10(3) UL (ref 0–1)
IMM GRANULOCYTES NFR BLD: 0.4 %
LYMPHOCYTES # BLD AUTO: 1.55 X10(3) UL (ref 1–4)
LYMPHOCYTES NFR BLD AUTO: 27.9 %
MAGNESIUM SERPL-MCNC: 1.9 MG/DL (ref 1.6–2.6)
MCH RBC QN AUTO: 29.7 PG (ref 26–34)
MCHC RBC AUTO-ENTMCNC: 33.5 G/DL (ref 31–37)
MCV RBC AUTO: 88.7 FL (ref 80–100)
MONOCYTES # BLD AUTO: 0.52 X10(3) UL (ref 0.1–1)
MONOCYTES NFR BLD AUTO: 9.4 %
NEUTROPHILS # BLD AUTO: 3.35 X10 (3) UL (ref 1.5–7.7)
NEUTROPHILS # BLD AUTO: 3.35 X10(3) UL (ref 1.5–7.7)
NEUTROPHILS NFR BLD AUTO: 60.4 %
OSMOLALITY SERPL CALC.SUM OF ELEC: 284 MOSM/KG (ref 275–295)
PLATELET # BLD AUTO: 244 10(3)UL (ref 150–450)
POTASSIUM SERPL-SCNC: 3.1 MMOL/L (ref 3.5–5.1)
RBC # BLD AUTO: 3.8 X10(6)UL (ref 3.8–5.3)
SODIUM SERPL-SCNC: 138 MMOL/L (ref 136–145)
TROPONIN I SERPL HS-MCNC: 4 NG/L (ref ?–34)
WBC # BLD AUTO: 5.6 X10(3) UL (ref 4–11)

## 2025-08-28 PROCEDURE — 93005 ELECTROCARDIOGRAM TRACING: CPT

## 2025-08-28 PROCEDURE — 99285 EMERGENCY DEPT VISIT HI MDM: CPT

## 2025-08-28 PROCEDURE — 96360 HYDRATION IV INFUSION INIT: CPT

## 2025-08-28 PROCEDURE — 85025 COMPLETE CBC W/AUTO DIFF WBC: CPT | Performed by: STUDENT IN AN ORGANIZED HEALTH CARE EDUCATION/TRAINING PROGRAM

## 2025-08-28 PROCEDURE — 83735 ASSAY OF MAGNESIUM: CPT | Performed by: STUDENT IN AN ORGANIZED HEALTH CARE EDUCATION/TRAINING PROGRAM

## 2025-08-28 PROCEDURE — 96361 HYDRATE IV INFUSION ADD-ON: CPT

## 2025-08-28 PROCEDURE — 82962 GLUCOSE BLOOD TEST: CPT

## 2025-08-28 PROCEDURE — 84484 ASSAY OF TROPONIN QUANT: CPT | Performed by: STUDENT IN AN ORGANIZED HEALTH CARE EDUCATION/TRAINING PROGRAM

## 2025-08-28 PROCEDURE — 93010 ELECTROCARDIOGRAM REPORT: CPT

## 2025-08-28 PROCEDURE — 84703 CHORIONIC GONADOTROPIN ASSAY: CPT | Performed by: STUDENT IN AN ORGANIZED HEALTH CARE EDUCATION/TRAINING PROGRAM

## 2025-08-28 PROCEDURE — 80048 BASIC METABOLIC PNL TOTAL CA: CPT | Performed by: STUDENT IN AN ORGANIZED HEALTH CARE EDUCATION/TRAINING PROGRAM

## 2025-08-28 RX ORDER — SODIUM PHOSPHATE, DIBASIC AND SODIUM PHOSPHATE, MONOBASIC 7; 19 G/230ML; G/230ML
1 ENEMA RECTAL ONCE AS NEEDED
Status: DISCONTINUED | OUTPATIENT
Start: 2025-08-28 | End: 2025-08-29

## 2025-08-28 RX ORDER — POTASSIUM CHLORIDE 1500 MG/1
40 TABLET, EXTENDED RELEASE ORAL ONCE
Status: COMPLETED | OUTPATIENT
Start: 2025-08-28 | End: 2025-08-28

## 2025-08-28 RX ORDER — ENOXAPARIN SODIUM 100 MG/ML
40 INJECTION SUBCUTANEOUS DAILY
Status: DISCONTINUED | OUTPATIENT
Start: 2025-08-29 | End: 2025-08-29

## 2025-08-28 RX ORDER — PROCHLORPERAZINE EDISYLATE 5 MG/ML
5 INJECTION INTRAMUSCULAR; INTRAVENOUS EVERY 8 HOURS PRN
Status: DISCONTINUED | OUTPATIENT
Start: 2025-08-28 | End: 2025-08-29

## 2025-08-28 RX ORDER — ACETAMINOPHEN 325 MG/1
650 TABLET ORAL EVERY 4 HOURS PRN
Status: DISCONTINUED | OUTPATIENT
Start: 2025-08-28 | End: 2025-08-29

## 2025-08-28 RX ORDER — ACETAMINOPHEN 500 MG
500 TABLET ORAL EVERY 4 HOURS PRN
Status: DISCONTINUED | OUTPATIENT
Start: 2025-08-28 | End: 2025-08-29

## 2025-08-28 RX ORDER — SODIUM CHLORIDE 9 MG/ML
INJECTION, SOLUTION INTRAVENOUS CONTINUOUS
Status: DISCONTINUED | OUTPATIENT
Start: 2025-08-28 | End: 2025-08-29

## 2025-08-28 RX ORDER — SENNOSIDES 8.6 MG
17.2 TABLET ORAL NIGHTLY PRN
Status: DISCONTINUED | OUTPATIENT
Start: 2025-08-28 | End: 2025-08-29

## 2025-08-28 RX ORDER — ONDANSETRON 2 MG/ML
4 INJECTION INTRAMUSCULAR; INTRAVENOUS EVERY 6 HOURS PRN
Status: DISCONTINUED | OUTPATIENT
Start: 2025-08-28 | End: 2025-08-29

## 2025-08-28 RX ORDER — LISINOPRIL 5 MG/1
5 TABLET ORAL DAILY
COMMUNITY

## 2025-08-28 RX ORDER — POLYETHYLENE GLYCOL 3350 17 G/17G
17 POWDER, FOR SOLUTION ORAL DAILY PRN
Status: DISCONTINUED | OUTPATIENT
Start: 2025-08-28 | End: 2025-08-29

## 2025-08-28 RX ORDER — BISACODYL 10 MG
10 SUPPOSITORY, RECTAL RECTAL
Status: DISCONTINUED | OUTPATIENT
Start: 2025-08-28 | End: 2025-08-29

## 2025-08-28 RX ORDER — HYDROCODONE BITARTRATE AND ACETAMINOPHEN 5; 325 MG/1; MG/1
1 TABLET ORAL EVERY 4 HOURS PRN
Status: DISCONTINUED | OUTPATIENT
Start: 2025-08-28 | End: 2025-08-29

## 2025-08-28 RX ORDER — HYDROCODONE BITARTRATE AND ACETAMINOPHEN 5; 325 MG/1; MG/1
2 TABLET ORAL EVERY 4 HOURS PRN
Status: DISCONTINUED | OUTPATIENT
Start: 2025-08-28 | End: 2025-08-29

## 2025-08-29 ENCOUNTER — APPOINTMENT (OUTPATIENT)
Dept: CV DIAGNOSTICS | Facility: HOSPITAL | Age: 50
End: 2025-08-29
Attending: INTERNAL MEDICINE

## 2025-08-29 ENCOUNTER — APPOINTMENT (OUTPATIENT)
Dept: MRI IMAGING | Facility: HOSPITAL | Age: 50
End: 2025-08-29
Attending: Other

## 2025-08-29 ENCOUNTER — NURSE ONLY (OUTPATIENT)
Dept: ELECTROPHYSIOLOGY | Facility: HOSPITAL | Age: 50
End: 2025-08-29
Attending: Other

## 2025-08-29 VITALS
OXYGEN SATURATION: 98 % | BODY MASS INDEX: 25.76 KG/M2 | HEIGHT: 62 IN | WEIGHT: 140 LBS | RESPIRATION RATE: 16 BRPM | HEART RATE: 69 BPM | TEMPERATURE: 99 F | DIASTOLIC BLOOD PRESSURE: 86 MMHG | SYSTOLIC BLOOD PRESSURE: 131 MMHG

## 2025-08-29 LAB
ANION GAP SERPL CALC-SCNC: 13 MMOL/L (ref 0–18)
ATRIAL RATE: 81 BPM
ATRIAL RATE: 93 BPM
BASOPHILS # BLD AUTO: 0.03 X10(3) UL (ref 0–0.2)
BASOPHILS NFR BLD AUTO: 0.4 %
BUN BLD-MCNC: 8 MG/DL (ref 9–23)
CALCIUM BLD-MCNC: 8.7 MG/DL (ref 8.7–10.6)
CHLORIDE SERPL-SCNC: 109 MMOL/L (ref 98–112)
CO2 SERPL-SCNC: 22 MMOL/L (ref 21–32)
CREAT BLD-MCNC: 0.9 MG/DL (ref 0.55–1.02)
EGFRCR SERPLBLD CKD-EPI 2021: 79 ML/MIN/1.73M2 (ref 60–?)
EOSINOPHIL # BLD AUTO: 0.12 X10(3) UL (ref 0–0.7)
EOSINOPHIL NFR BLD AUTO: 1.8 %
ERYTHROCYTE [DISTWIDTH] IN BLOOD BY AUTOMATED COUNT: 13.2 %
GLUCOSE BLD-MCNC: 94 MG/DL (ref 70–99)
HCT VFR BLD AUTO: 30.2 % (ref 35–48)
HGB BLD-MCNC: 10.2 G/DL (ref 12–16)
IMM GRANULOCYTES # BLD AUTO: 0.02 X10(3) UL (ref 0–1)
IMM GRANULOCYTES NFR BLD: 0.3 %
LYMPHOCYTES # BLD AUTO: 2.69 X10(3) UL (ref 1–4)
LYMPHOCYTES NFR BLD AUTO: 39.5 %
MCH RBC QN AUTO: 29.7 PG (ref 26–34)
MCHC RBC AUTO-ENTMCNC: 33.8 G/DL (ref 31–37)
MCV RBC AUTO: 87.8 FL (ref 80–100)
MONOCYTES # BLD AUTO: 0.69 X10(3) UL (ref 0.1–1)
MONOCYTES NFR BLD AUTO: 10.1 %
NEUTROPHILS # BLD AUTO: 3.26 X10 (3) UL (ref 1.5–7.7)
NEUTROPHILS # BLD AUTO: 3.26 X10(3) UL (ref 1.5–7.7)
NEUTROPHILS NFR BLD AUTO: 47.9 %
OSMOLALITY SERPL CALC.SUM OF ELEC: 296 MOSM/KG (ref 275–295)
P AXIS: 137 DEGREES
P AXIS: 55 DEGREES
P-R INTERVAL: 150 MS
P-R INTERVAL: 166 MS
PLATELET # BLD AUTO: 230 10(3)UL (ref 150–450)
POTASSIUM SERPL-SCNC: 4.2 MMOL/L (ref 3.5–5.1)
Q-T INTERVAL: 388 MS
Q-T INTERVAL: 392 MS
QRS DURATION: 88 MS
QRS DURATION: 88 MS
QTC CALCULATION (BEZET): 450 MS
QTC CALCULATION (BEZET): 487 MS
R AXIS: 126 DEGREES
R AXIS: 54 DEGREES
RBC # BLD AUTO: 3.44 X10(6)UL (ref 3.8–5.3)
SODIUM SERPL-SCNC: 144 MMOL/L (ref 136–145)
T AXIS: 139 DEGREES
T AXIS: 53 DEGREES
VENTRICULAR RATE: 81 BPM
VENTRICULAR RATE: 93 BPM
WBC # BLD AUTO: 6.8 X10(3) UL (ref 4–11)

## 2025-08-29 PROCEDURE — 93306 TTE W/DOPPLER COMPLETE: CPT | Performed by: INTERNAL MEDICINE

## 2025-08-29 PROCEDURE — 95819 EEG AWAKE AND ASLEEP: CPT

## 2025-08-29 PROCEDURE — 93005 ELECTROCARDIOGRAM TRACING: CPT

## 2025-08-29 PROCEDURE — 93010 ELECTROCARDIOGRAM REPORT: CPT | Performed by: INTERNAL MEDICINE

## 2025-08-29 PROCEDURE — 85025 COMPLETE CBC W/AUTO DIFF WBC: CPT | Performed by: INTERNAL MEDICINE

## 2025-08-29 PROCEDURE — 80048 BASIC METABOLIC PNL TOTAL CA: CPT | Performed by: INTERNAL MEDICINE

## 2025-08-29 PROCEDURE — A9575 INJ GADOTERATE MEGLUMI 0.1ML: HCPCS | Performed by: INTERNAL MEDICINE

## 2025-08-29 PROCEDURE — 70553 MRI BRAIN STEM W/O & W/DYE: CPT | Performed by: OTHER

## 2025-08-29 RX ORDER — GADOTERATE MEGLUMINE 376.9 MG/ML
15 INJECTION INTRAVENOUS
Status: COMPLETED | OUTPATIENT
Start: 2025-08-29 | End: 2025-08-29

## 2025-08-29 RX ORDER — LISINOPRIL 2.5 MG/1
5 TABLET ORAL DAILY
Status: DISCONTINUED | OUTPATIENT
Start: 2025-08-29 | End: 2025-08-29

## (undated) NOTE — LETTER
Date: 1/14/2025    Patient Name: Jane De La Rosa          To Whom it may concern:    The above patient was seen at [facility] for treatment of a medical condition.    This patient should be excused from lifting anything over 5lbs for 3 days due to undergoing a procedure today.      Sincerely,          Calvinr JANESSA Reilly [facility], PA-C Orthopedic Surgery / Sports Medicine Specialist  [facility] Orthopaedic Surgery  [address] [place] [address].[email or web address]  [email or web address]  t: [phone]  o: [phone]  f: [phone]